# Patient Record
Sex: FEMALE | Race: WHITE | NOT HISPANIC OR LATINO | URBAN - METROPOLITAN AREA
[De-identification: names, ages, dates, MRNs, and addresses within clinical notes are randomized per-mention and may not be internally consistent; named-entity substitution may affect disease eponyms.]

---

## 2023-09-27 ENCOUNTER — LAB (OUTPATIENT)
Dept: LAB | Facility: LAB | Age: 23
End: 2023-09-27
Payer: COMMERCIAL

## 2023-09-29 LAB
NIL(NEG) CONTROL SPOT COUNT: NORMAL
PANEL A SPOT COUNT: 0
PANEL B SPOT COUNT: 2
POS CONTROL SPOT COUNT: NORMAL
T-SPOT. TB INTERPRETATION: NEGATIVE

## 2024-03-23 ENCOUNTER — HOSPITAL ENCOUNTER (OUTPATIENT)
Dept: RADIOLOGY | Facility: CLINIC | Age: 24
Discharge: HOME | End: 2024-03-23
Payer: COMMERCIAL

## 2024-03-23 DIAGNOSIS — S69.92XA INJURY OF LEFT INDEX FINGER, INITIAL ENCOUNTER: ICD-10-CM

## 2024-03-23 PROCEDURE — 73140 X-RAY EXAM OF FINGER(S): CPT | Mod: LT

## 2024-03-23 PROCEDURE — 73140 X-RAY EXAM OF FINGER(S): CPT | Mod: LEFT SIDE | Performed by: RADIOLOGY

## 2024-04-10 ENCOUNTER — OFFICE VISIT (OUTPATIENT)
Dept: ORTHOPEDIC SURGERY | Facility: CLINIC | Age: 24
End: 2024-04-10
Payer: COMMERCIAL

## 2024-04-10 DIAGNOSIS — S63.631A SPRAIN OF INTERPHALANGEAL JOINT OF LEFT INDEX FINGER, INITIAL ENCOUNTER: Primary | ICD-10-CM

## 2024-04-10 PROCEDURE — 1036F TOBACCO NON-USER: CPT | Performed by: ORTHOPAEDIC SURGERY

## 2024-04-10 PROCEDURE — 99213 OFFICE O/P EST LOW 20 MIN: CPT | Performed by: ORTHOPAEDIC SURGERY

## 2024-04-10 PROCEDURE — 99203 OFFICE O/P NEW LOW 30 MIN: CPT | Performed by: ORTHOPAEDIC SURGERY

## 2024-04-10 RX ORDER — BUPROPION HYDROCHLORIDE 300 MG/1
300 TABLET ORAL DAILY
COMMUNITY

## 2024-04-10 NOTE — LETTER
April 11, 2024     Ginette Spring MD  [address] [facility] [address]    Patient: Jyoti Thomas   YOB: 2000   Date of Visit: 4/10/2024       Dear Dr. Ginette Spring MD:    Thank you for referring Jyoti Thomas to me for evaluation. Below are my notes for this consultation.  If you have questions, please do not hesitate to call me. I look forward to following your patient along with you.       Sincerely,     Braeden Gooden MD      CC: No Recipients  ______________________________________________________________________________________    CHIEF COMPLAINT         Left index finger pain after fall    ASSESSMENT + PLAN    Left index PIP radial collateral grade 1 sprain    I reviewed the nature of the injury and the surprisingly long expected duration of mild swelling and discomfort.  This is a stable injury and you may safely advance activity as pain and stiffness allow.    Rudolph tape index-long following the technique that was demonstrated, using the provided supplies.  This should be done for sports or other heavy hand activities until the finger is feeling normal.      Work on the range of motion exercises that I demonstrated.  Contact my office if you would like a formal occupational therapy referral.      Follow-up with any other concerns.        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  This occurred when she slipped outside and fell, landing on outstretched left hand on March 22.  She had pain and swelling in the left index PIP.  She was seen at [facility] urgent care the next day, and x-rays were obtained.  She has been using a splint intermittently for comfort.  Pain has been gradually improving as has range of motion, but they are not yet fully normal now more than 2 weeks after the event.  No popping, clicking, or subjective  instability.  No numbness or tingling.  She denies other significant injury to that hand in the past.  No other active upper extremity concerns today.    She is not diabetic or hypothyroid.  She does not smoke.      REVIEW OF SYSTEMS       A 30-item multi-system Review Of Systems was obtained on today's intake form.  This was reviewed with the patient and is correct.  The pertinent positives and negatives are listed above.  The form has been scanned separately into the medical record.      PHYSICAL EXAM    Constitutional:    Appears stated age. Well-developed and well-nourished female in no acute distress.  Psychiatric:         Pleasant normal mood and affect. Behavior is appropriate for the situation.   Head:                   Normocephalic and atraumatic.  Eyes:                    Pupils are equal and round.  Cardiovascular:  2+ radial and ulnar pulses. Fingers well-perfused.  Respiratory:        Effort normal. No respiratory distress. Speaking in complete sentences.  Neurologic:       Alert and oriented to person, place, and time.  Skin:                Skin is intact, warm and dry.  Hematologic / Lymphatic:    No lymphedema or lymphangitis.    Extremities / Musculoskeletal:                      Skin of left index finger and hand is intact with no erythema, ecchymosis, or generalized swelling.  There is slight focal swelling at the radial aspect of the left index PIP.  She does have a little discomfort to palpation there.  Pain is reproduced with ulnar directed stress of the joint, though it is stable.  Confirmatory rotatory discomfort with good stability.  Tendons are intact by individual testing.  Full composite finger flexion extension with full intrinsic plus minus posture.  Symmetric wrist and forearm motion.  Negative Mota.  Negative midcarpal shift.  Sensation intact to light touch in all distributions.  Capillary refill less than 2 seconds.      IMAGING / LABS / EMGs           X-rays left index finger  from March 23 were independently interpreted by me today and show no acute fracture, subluxation, or foreign body.  Joint spaces are concentric and well preserved.      History reviewed. No pertinent past medical history.    Medication Documentation Review Audit       Reviewed by Braeden Gooden MD (Physician) on 04/11/24 at 1502      Medication Order Taking? Sig Documenting Provider Last Dose Status   buPROPion XL (Wellbutrin XL) 300 mg 24 hr tablet [id number]  Take 1 tablet (300 mg) by mouth once daily. Historical Provider, MD  Active                    No Known Allergies    Social History     Socioeconomic History   • Marital status: Single     Spouse name: Not on file   • Number of children: Not on file   • Years of education: Not on file   • Highest education level: Not on file   Occupational History   • Not on file   Tobacco Use   • Smoking status: Never   • Smokeless tobacco: Never   Substance and Sexual Activity   • Alcohol use: Not on file   • Drug use: Not on file   • Sexual activity: Not on file   Other Topics Concern   • Not on file   Social History Narrative   • Not on file     Social Determinants of Health     Financial Resource Strain: Not on file   Food Insecurity: Not on file   Transportation Needs: Not on file   Physical Activity: Not on file   Stress: Not on file   Social Connections: Not on file   Intimate Partner Violence: Not on file   Housing Stability: Not on file       History reviewed. No pertinent surgical history.      Electronically Signed      BUNNY Gooden MD      Orthopaedic Hand Surgery      [phone]

## 2024-04-11 PROBLEM — S63.631A SPRAIN OF INTERPHALANGEAL JOINT OF LEFT INDEX FINGER: Status: ACTIVE | Noted: 2024-04-11

## 2024-04-11 NOTE — PROGRESS NOTES
CHIEF COMPLAINT         Left index finger pain after fall    ASSESSMENT + PLAN    Left index PIP radial collateral grade 1 sprain    I reviewed the nature of the injury and the surprisingly long expected duration of mild swelling and discomfort.  This is a stable injury and you may safely advance activity as pain and stiffness allow.    Rudolph tape index-long following the technique that was demonstrated, using the provided supplies.  This should be done for sports or other heavy hand activities until the finger is feeling normal.      Work on the range of motion exercises that I demonstrated.  Contact my office if you would like a formal occupational therapy referral.      Follow-up with any other concerns.        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  This occurred when she slipped outside and fell, landing on outstretched left hand on March 22.  She had pain and swelling in the left index PIP.  She was seen at [facility] urgent care the next day, and x-rays were obtained.  She has been using a splint intermittently for comfort.  Pain has been gradually improving as has range of motion, but they are not yet fully normal now more than 2 weeks after the event.  No popping, clicking, or subjective instability.  No numbness or tingling.  She denies other significant injury to that hand in the past.  No other active upper extremity concerns today.    She is not diabetic or hypothyroid.  She does not smoke.      REVIEW OF SYSTEMS       A 30-item multi-system Review Of Systems was obtained on today's intake form.  This was reviewed with the patient and is correct.  The pertinent positives and negatives are listed above.  The form has been scanned separately into the medical record.      PHYSICAL EXAM    Constitutional:    Appears stated age. Well-developed and well-nourished female in no  acute distress.  Psychiatric:         Pleasant normal mood and affect. Behavior is appropriate for the situation.   Head:                   Normocephalic and atraumatic.  Eyes:                    Pupils are equal and round.  Cardiovascular:  2+ radial and ulnar pulses. Fingers well-perfused.  Respiratory:        Effort normal. No respiratory distress. Speaking in complete sentences.  Neurologic:       Alert and oriented to person, place, and time.  Skin:                Skin is intact, warm and dry.  Hematologic / Lymphatic:    No lymphedema or lymphangitis.    Extremities / Musculoskeletal:                      Skin of left index finger and hand is intact with no erythema, ecchymosis, or generalized swelling.  There is slight focal swelling at the radial aspect of the left index PIP.  She does have a little discomfort to palpation there.  Pain is reproduced with ulnar directed stress of the joint, though it is stable.  Confirmatory rotatory discomfort with good stability.  Tendons are intact by individual testing.  Full composite finger flexion extension with full intrinsic plus minus posture.  Symmetric wrist and forearm motion.  Negative Mota.  Negative midcarpal shift.  Sensation intact to light touch in all distributions.  Capillary refill less than 2 seconds.      IMAGING / LABS / EMGs           X-rays left index finger from March 23 were independently interpreted by me today and show no acute fracture, subluxation, or foreign body.  Joint spaces are concentric and well preserved.      History reviewed. No pertinent past medical history.    Medication Documentation Review Audit       Reviewed by Braeden Gooden MD (Physician) on 04/11/24 at 1502      Medication Order Taking? Sig Documenting Provider Last Dose Status   buPROPion XL (Wellbutrin XL) 300 mg 24 hr tablet [id number]  Take 1 tablet (300 mg) by mouth once daily. Historical Provider, MD  Active                    No Known Allergies    Social History      Socioeconomic History    Marital status: Single     Spouse name: Not on file    Number of children: Not on file    Years of education: Not on file    Highest education level: Not on file   Occupational History    Not on file   Tobacco Use    Smoking status: Never    Smokeless tobacco: Never   Substance and Sexual Activity    Alcohol use: Not on file    Drug use: Not on file    Sexual activity: Not on file   Other Topics Concern    Not on file   Social History Narrative    Not on file     Social Determinants of Health     Financial Resource Strain: Not on file   Food Insecurity: Not on file   Transportation Needs: Not on file   Physical Activity: Not on file   Stress: Not on file   Social Connections: Not on file   Intimate Partner Violence: Not on file   Housing Stability: Not on file       History reviewed. No pertinent surgical history.      Electronically Signed      BUNNY Gooden MD      Orthopaedic Hand Surgery      [phone]

## 2024-04-23 ENCOUNTER — APPOINTMENT (OUTPATIENT)
Dept: ORTHOPEDIC SURGERY | Facility: CLINIC | Age: 24
End: 2024-04-23
Payer: COMMERCIAL